# Patient Record
Sex: FEMALE | Race: WHITE | HISPANIC OR LATINO | Employment: FULL TIME | URBAN - METROPOLITAN AREA
[De-identification: names, ages, dates, MRNs, and addresses within clinical notes are randomized per-mention and may not be internally consistent; named-entity substitution may affect disease eponyms.]

---

## 2023-02-12 PROBLEM — F98.8 ADD (ATTENTION DEFICIT DISORDER): Status: ACTIVE | Noted: 2023-02-12

## 2023-02-12 PROBLEM — R10.11 ABDOMINAL PAIN, RUQ: Status: ACTIVE | Noted: 2023-02-12

## 2023-02-12 PROBLEM — G56.20 ULNAR NEUROPATHY: Status: ACTIVE | Noted: 2023-02-12

## 2023-02-12 PROBLEM — R03.0 BORDERLINE SYSTOLIC HYPERTENSION: Status: ACTIVE | Noted: 2023-02-12

## 2023-02-12 PROBLEM — L30.9 DERMATITIS: Status: ACTIVE | Noted: 2023-02-12

## 2023-02-12 PROBLEM — G54.0 THORACIC OUTLET SYNDROME: Status: ACTIVE | Noted: 2023-02-12

## 2023-02-12 PROBLEM — K63.8219 SMALL INTESTINAL BACTERIAL OVERGROWTH: Status: ACTIVE | Noted: 2023-02-12

## 2023-02-12 PROBLEM — R00.2 PALPITATIONS: Status: ACTIVE | Noted: 2023-02-12

## 2023-02-12 PROBLEM — I72.9 PSEUDOANEURYSM (CMS-HCC): Status: ACTIVE | Noted: 2023-02-12

## 2023-02-12 PROBLEM — R21 RASH: Status: ACTIVE | Noted: 2023-02-12

## 2023-02-12 PROBLEM — G95.0 SYRINX OF SPINAL CORD (MULTI): Status: ACTIVE | Noted: 2023-02-12

## 2023-02-12 PROBLEM — I47.10 PAROXYSMAL SVT (SUPRAVENTRICULAR TACHYCARDIA) (CMS-HCC): Status: ACTIVE | Noted: 2023-02-12

## 2023-02-12 PROBLEM — G43.909 MIGRAINE: Status: ACTIVE | Noted: 2023-02-12

## 2023-02-12 PROBLEM — R42 VERTIGO: Status: ACTIVE | Noted: 2023-02-12

## 2023-02-12 PROBLEM — I77.0 AV FISTULA (CMS-HCC): Status: ACTIVE | Noted: 2023-02-12

## 2023-02-12 PROBLEM — G43.019 INTRACTABLE MIGRAINE WITHOUT AURA AND WITHOUT STATUS MIGRAINOSUS: Status: ACTIVE | Noted: 2023-02-12

## 2023-02-12 PROBLEM — E55.9 VITAMIN D DEFICIENCY: Status: ACTIVE | Noted: 2023-02-12

## 2023-02-12 PROBLEM — M25.512 SHOULDER PAIN, LEFT: Status: ACTIVE | Noted: 2023-02-12

## 2023-02-12 PROBLEM — R00.0 TACHYCARDIA: Status: ACTIVE | Noted: 2023-02-12

## 2023-02-12 PROBLEM — M54.2 NECK PAIN: Status: ACTIVE | Noted: 2023-02-12

## 2023-02-12 PROBLEM — K52.9 CHRONIC DIARRHEA OF UNKNOWN ORIGIN: Status: ACTIVE | Noted: 2023-02-12

## 2023-02-12 PROBLEM — F41.9 ANXIETY: Status: ACTIVE | Noted: 2023-02-12

## 2023-02-12 PROBLEM — R20.2 PARESTHESIA: Status: ACTIVE | Noted: 2023-02-12

## 2023-02-12 PROBLEM — R09.81 NASAL CONGESTION: Status: ACTIVE | Noted: 2023-02-12

## 2023-02-12 RX ORDER — MOMETASONE FUROATE 1 MG/G
CREAM TOPICAL
COMMUNITY
Start: 2021-06-14

## 2023-02-12 RX ORDER — AMLODIPINE BESYLATE 2.5 MG/1
TABLET ORAL
COMMUNITY
End: 2023-12-20 | Stop reason: SDUPTHER

## 2023-02-12 RX ORDER — TIZANIDINE 4 MG/1
TABLET ORAL
COMMUNITY
Start: 2022-02-16 | End: 2023-12-20 | Stop reason: WASHOUT

## 2023-02-12 RX ORDER — NORETHINDRONE ACETATE AND ETHINYL ESTRADIOL 1.5-30(21)
KIT ORAL
COMMUNITY
Start: 2018-08-09

## 2023-02-12 RX ORDER — DEXMETHYLPHENIDATE HYDROCHLORIDE 15 MG/1
CAPSULE, EXTENDED RELEASE ORAL
COMMUNITY
Start: 2019-09-30 | End: 2023-03-17 | Stop reason: SDUPTHER

## 2023-02-12 RX ORDER — ACETAMINOPHEN, DEXTROMETHORPHAN HBR, DOXYLAMINE SUCCINATE, PHENYLEPHRINE HCL 650; 20; 12.5; 1 MG/30ML; MG/30ML; MG/30ML; MG/30ML
SOLUTION ORAL
COMMUNITY
Start: 2017-12-18

## 2023-03-17 ENCOUNTER — OFFICE VISIT (OUTPATIENT)
Dept: PRIMARY CARE | Facility: CLINIC | Age: 28
End: 2023-03-17
Payer: COMMERCIAL

## 2023-03-17 VITALS
BODY MASS INDEX: 25.54 KG/M2 | WEIGHT: 148.8 LBS | SYSTOLIC BLOOD PRESSURE: 126 MMHG | TEMPERATURE: 97.8 F | DIASTOLIC BLOOD PRESSURE: 78 MMHG

## 2023-03-17 DIAGNOSIS — F98.8 ATTENTION DEFICIT DISORDER, UNSPECIFIED HYPERACTIVITY PRESENCE: Primary | ICD-10-CM

## 2023-03-17 PROCEDURE — 1036F TOBACCO NON-USER: CPT | Performed by: INTERNAL MEDICINE

## 2023-03-17 PROCEDURE — 80307 DRUG TEST PRSMV CHEM ANLYZR: CPT

## 2023-03-17 PROCEDURE — 99213 OFFICE O/P EST LOW 20 MIN: CPT | Performed by: INTERNAL MEDICINE

## 2023-03-17 PROCEDURE — 80324 DRUG SCREEN AMPHETAMINES 1/2: CPT

## 2023-03-17 RX ORDER — DEXMETHYLPHENIDATE HYDROCHLORIDE 15 MG/1
15 CAPSULE, EXTENDED RELEASE ORAL DAILY
Qty: 30 CAPSULE | Refills: 0 | Status: SHIPPED | OUTPATIENT
Start: 2023-03-17 | End: 2023-05-08 | Stop reason: SDUPTHER

## 2023-03-17 NOTE — PROGRESS NOTES
Subjective   Patient ID: Marina Quiroga is a 27 y.o. female who presents for med follow up.    HPI   Good focus and concentration but no anxiety. In general feels well. Again no issue with anxiety currently. Substantially improved work function with stimulant. No change in appetite. No insomnia. In general feels quite well.     Review of Systems   All other systems reviewed and are negative.    Objective   /78   Temp 36.6 °C (97.8 °F)   Wt 67.5 kg (148 lb 12.8 oz)   BMI 25.54 kg/m²     Physical Exam  Constitutional:       Appearance: Normal appearance.   Neurological:      Mental Status: She is alert.   Psychiatric:         Mood and Affect: Mood normal.         Thought Content: Thought content normal.         Judgment: Judgment normal.     Assessment/Plan   Problem List Items Addressed This Visit          Other    ADD (attention deficit disorder) - Primary    Relevant Medications    dexmethylphenidate XR (Focalin XR) 15 mg 24 hr capsule    Other Relevant Orders    Amphetamine Confirm, Urine    Drug Screen, Urine With Reflex to Confirmation    Drug Screen, Urine With Reflex to Confirmation (Completed)    Amphetamine Confirm, Urine       #1 ADD. con't rx. Spent time reviewing risks of this medicine. Discussed abuse and addiction potential. Discussed potential cardiovascular adverse events. Discussed anxiety. On tract updated. I have personally reviewed the OARRS report for the patient. This report is scanned into the electronic medical record. I have considered the risks of abuse, dependence, addiction and diversion. I believe that it is clinically appropriate for the patient to be prescribed this medication. . Urine next visit. Consider reducing dose next visit.  Per cardiologist, safety continue stimulant  #2 cervical spine lesion- status post neurosurgery evaluation. Follow-up neurology  #2 headaches-much better  #4 CTS- good   #5 SVT/AVNRT- clinically stable. Follow with cardiology/EP  #6 Anxiety-  better/good.   #7 AV fistula- f/u vasc  #8 increased BPs- con't low salt. exercsie. c/s INB ~6 mths (?b-blocker).   #9 vertigo- low-grade. c/s PT. f/u inb 3-4 weeks

## 2023-03-18 LAB
AMPHETAMINE (PRESENCE) IN URINE BY SCREEN METHOD: NORMAL
BARBITURATES PRESENCE IN URINE BY SCREEN METHOD: NORMAL
BENZODIAZEPINE (PRESENCE) IN URINE BY SCREEN METHOD: NORMAL
CANNABINOIDS IN URINE BY SCREEN METHOD: NORMAL
COCAINE (PRESENCE) IN URINE BY SCREEN METHOD: NORMAL
DRUG SCREEN COMMENT URINE: NORMAL
FENTANYL URINE: NORMAL
METHADONE (PRESENCE) IN URINE BY SCREEN METHOD: NORMAL
OPIATES (PRESENCE) IN URINE BY SCREEN METHOD: NORMAL
OXYCODONE (PRESENCE) IN URINE BY SCREEN METHOD: NORMAL
PHENCYCLIDINE (PRESENCE) IN URINE BY SCREEN METHOD: NORMAL

## 2023-03-22 LAB
AMPHETAMINES,URINE: <50 NG/ML
MDA,URINE: <200 NG/ML
MDEA,URINE: <200 NG/ML
MDMA,UR: <200 NG/ML
METHAMPHETAMINE QUANTITATIVE URINE: <200 NG/ML
PHENTERMINE,UR: <200 NG/ML

## 2023-05-05 ENCOUNTER — TELEPHONE (OUTPATIENT)
Dept: PRIMARY CARE | Facility: CLINIC | Age: 28
End: 2023-05-05
Payer: COMMERCIAL

## 2023-05-05 NOTE — TELEPHONE ENCOUNTER
Patient called, left message, stated that [facility] on [address] is out of stock for Dexmethylphenidate and would like you to forward the Rx to [facility] on [address] in [facility].

## 2023-05-08 DIAGNOSIS — F98.8 ATTENTION DEFICIT DISORDER, UNSPECIFIED HYPERACTIVITY PRESENCE: ICD-10-CM

## 2023-05-10 RX ORDER — DEXMETHYLPHENIDATE HYDROCHLORIDE 15 MG/1
15 CAPSULE, EXTENDED RELEASE ORAL DAILY
Qty: 30 CAPSULE | Refills: 0 | Status: SHIPPED | OUTPATIENT
Start: 2023-05-10 | End: 2023-08-28 | Stop reason: SDUPTHER

## 2023-08-28 ENCOUNTER — TELEPHONE (OUTPATIENT)
Dept: PRIMARY CARE | Facility: CLINIC | Age: 28
End: 2023-08-28
Payer: COMMERCIAL

## 2023-08-28 DIAGNOSIS — F98.8 ATTENTION DEFICIT DISORDER, UNSPECIFIED HYPERACTIVITY PRESENCE: ICD-10-CM

## 2023-08-28 RX ORDER — DEXMETHYLPHENIDATE HYDROCHLORIDE 15 MG/1
15 CAPSULE, EXTENDED RELEASE ORAL DAILY
Qty: 30 CAPSULE | Refills: 0 | Status: SHIPPED | OUTPATIENT
Start: 2023-08-28 | End: 2023-10-19 | Stop reason: SDUPTHER

## 2023-08-28 NOTE — TELEPHONE ENCOUNTER
Patient has apt in Sept, but needs a refill on Dexmethylphenidate 15mg now to [facility].  She took her last one today

## 2023-08-28 NOTE — TELEPHONE ENCOUNTER
Pt left a msg asking for a refill of her Focalin XR 15 mg.  Pharm is [facility], [address]  Pt has VV on 9/5.

## 2023-09-05 ENCOUNTER — APPOINTMENT (OUTPATIENT)
Dept: PRIMARY CARE | Facility: CLINIC | Age: 28
End: 2023-09-05
Payer: COMMERCIAL

## 2023-09-15 ENCOUNTER — TELEMEDICINE (OUTPATIENT)
Dept: PRIMARY CARE | Facility: CLINIC | Age: 28
End: 2023-09-15
Payer: COMMERCIAL

## 2023-09-15 DIAGNOSIS — I47.10 PAROXYSMAL SVT (SUPRAVENTRICULAR TACHYCARDIA) (CMS-HCC): ICD-10-CM

## 2023-09-15 DIAGNOSIS — G95.0 SYRINX OF SPINAL CORD (MULTI): ICD-10-CM

## 2023-09-15 DIAGNOSIS — F98.8 ATTENTION DEFICIT DISORDER, UNSPECIFIED HYPERACTIVITY PRESENCE: ICD-10-CM

## 2023-09-15 PROCEDURE — 99213 OFFICE O/P EST LOW 20 MIN: CPT | Performed by: INTERNAL MEDICINE

## 2023-09-15 NOTE — PROGRESS NOTES
Subjective   Patient ID: Marina Quiroga is a 28 y.o. female who presents for No chief complaint on file..    HPI   Good focus and concentration but no anxiety. In general feels well. Again no issue with anxiety currently. Substantially improved work function with stimulant. No change in appetite. No insomnia. In general feels quite well.     Review of Systems   All other systems reviewed and are negative.      Objective   There were no vitals taken for this visit.    Physical Exam  Constitutional:       Appearance: Normal appearance.   Neurological:      Mental Status: She is alert.   Psychiatric:         Mood and Affect: Mood normal.         Thought Content: Thought content normal.         Judgment: Judgment normal.       Assessment/Plan   Problem List Items Addressed This Visit    None    #1 ADD. con't rx. Spent time reviewing risks of this medicine. Discussed abuse and addiction potential. Discussed potential cardiovascular adverse events. Discussed anxiety. On tract updated. I have personally reviewed the OARRS report for the patient. This report is scanned into the electronic medical record. I have considered the risks of abuse, dependence, addiction and diversion. I believe that it is clinically appropriate for the patient to be prescribed this medication. . Urine next visit. Consider reducing dose next visit.  Per cardiologist, safety continue stimulant  #2 cervical spine lesion- status post neurosurgery evaluation. Follow-up neurology  #2 headaches-much better  #4 CTS- good   #5 SVT/AVNRT- clinically stable. Follow with cardiology/EP  #6 Anxiety- better/good.   #7 AV fistula- f/u vasc  #8 increased BPs- con't low salt. exercsie. f/u  cards  #9 vertigo- low-grade. c/s PT. f/u inb 3-4 weeks

## 2023-10-03 ENCOUNTER — PHARMACY VISIT (OUTPATIENT)
Dept: PHARMACY | Facility: CLINIC | Age: 28
End: 2023-10-03
Payer: MEDICARE

## 2023-10-03 ENCOUNTER — SPECIALTY PHARMACY (OUTPATIENT)
Dept: PHARMACY | Facility: CLINIC | Age: 28
End: 2023-10-03

## 2023-10-03 ENCOUNTER — TELEPHONE (OUTPATIENT)
Dept: PRIMARY CARE | Facility: CLINIC | Age: 28
End: 2023-10-03
Payer: COMMERCIAL

## 2023-10-03 PROCEDURE — RXMED WILLOW AMBULATORY MEDICATION CHARGE

## 2023-10-03 NOTE — TELEPHONE ENCOUNTER
Pt left a msg asking for a refill of her Dicyclomine 10 mg.  This is for her irritable bowel and she usually needs to 1 time a yr.  Her pharm is [facility].  You can find this in EMR.

## 2023-10-04 DIAGNOSIS — K58.9 IRRITABLE BOWEL SYNDROME, UNSPECIFIED TYPE: Primary | ICD-10-CM

## 2023-10-04 RX ORDER — DICYCLOMINE HYDROCHLORIDE 10 MG/1
10 CAPSULE ORAL 3 TIMES DAILY PRN
Qty: 30 CAPSULE | Refills: 0 | Status: SHIPPED | OUTPATIENT
Start: 2023-10-04 | End: 2023-12-03

## 2023-10-19 ENCOUNTER — TELEPHONE (OUTPATIENT)
Dept: PRIMARY CARE | Facility: CLINIC | Age: 28
End: 2023-10-19
Payer: COMMERCIAL

## 2023-10-19 DIAGNOSIS — F98.8 ATTENTION DEFICIT DISORDER, UNSPECIFIED HYPERACTIVITY PRESENCE: ICD-10-CM

## 2023-10-19 RX ORDER — DEXMETHYLPHENIDATE HYDROCHLORIDE 15 MG/1
15 CAPSULE, EXTENDED RELEASE ORAL DAILY
Qty: 30 CAPSULE | Refills: 0 | Status: SHIPPED | OUTPATIENT
Start: 2023-10-19 | End: 2023-12-20 | Stop reason: SDUPTHER

## 2023-11-15 ENCOUNTER — APPOINTMENT (OUTPATIENT)
Dept: CARDIOLOGY | Facility: CLINIC | Age: 28
End: 2023-11-15
Payer: COMMERCIAL

## 2023-12-20 ENCOUNTER — TELEPHONE (OUTPATIENT)
Dept: PRIMARY CARE | Facility: CLINIC | Age: 28
End: 2023-12-20

## 2023-12-20 ENCOUNTER — OFFICE VISIT (OUTPATIENT)
Dept: CARDIOLOGY | Facility: CLINIC | Age: 28
End: 2023-12-20
Payer: COMMERCIAL

## 2023-12-20 VITALS
HEIGHT: 65 IN | WEIGHT: 150 LBS | SYSTOLIC BLOOD PRESSURE: 124 MMHG | BODY MASS INDEX: 24.99 KG/M2 | HEART RATE: 81 BPM | DIASTOLIC BLOOD PRESSURE: 84 MMHG | OXYGEN SATURATION: 98 %

## 2023-12-20 DIAGNOSIS — R00.0 TACHYCARDIA: Primary | ICD-10-CM

## 2023-12-20 DIAGNOSIS — R03.0 BORDERLINE SYSTOLIC HYPERTENSION: ICD-10-CM

## 2023-12-20 DIAGNOSIS — F98.8 ATTENTION DEFICIT DISORDER, UNSPECIFIED HYPERACTIVITY PRESENCE: ICD-10-CM

## 2023-12-20 PROCEDURE — 93005 ELECTROCARDIOGRAM TRACING: CPT | Performed by: INTERNAL MEDICINE

## 2023-12-20 PROCEDURE — 99213 OFFICE O/P EST LOW 20 MIN: CPT | Mod: 25 | Performed by: INTERNAL MEDICINE

## 2023-12-20 PROCEDURE — 93010 ELECTROCARDIOGRAM REPORT: CPT | Performed by: INTERNAL MEDICINE

## 2023-12-20 PROCEDURE — 99213 OFFICE O/P EST LOW 20 MIN: CPT | Performed by: INTERNAL MEDICINE

## 2023-12-20 PROCEDURE — 1036F TOBACCO NON-USER: CPT | Performed by: INTERNAL MEDICINE

## 2023-12-20 RX ORDER — AMLODIPINE BESYLATE 2.5 MG/1
2.5 TABLET ORAL DAILY
Qty: 90 TABLET | Refills: 3 | Status: SHIPPED | OUTPATIENT
Start: 2023-12-20

## 2023-12-20 ASSESSMENT — COLUMBIA-SUICIDE SEVERITY RATING SCALE - C-SSRS
6. HAVE YOU EVER DONE ANYTHING, STARTED TO DO ANYTHING, OR PREPARED TO DO ANYTHING TO END YOUR LIFE?: NO
1. IN THE PAST MONTH, HAVE YOU WISHED YOU WERE DEAD OR WISHED YOU COULD GO TO SLEEP AND NOT WAKE UP?: NO
2. HAVE YOU ACTUALLY HAD ANY THOUGHTS OF KILLING YOURSELF?: NO

## 2023-12-20 ASSESSMENT — PATIENT HEALTH QUESTIONNAIRE - PHQ9
1. LITTLE INTEREST OR PLEASURE IN DOING THINGS: NOT AT ALL
SUM OF ALL RESPONSES TO PHQ9 QUESTIONS 1 AND 2: 0
2. FEELING DOWN, DEPRESSED OR HOPELESS: NOT AT ALL

## 2023-12-20 NOTE — TELEPHONE ENCOUNTER
Patient called 12/19/23, left ms, requesting refill of generic of Focalin XR be sent to [facility] in [place].    Last seen 9/15/23.

## 2023-12-20 NOTE — PATIENT INSTRUCTIONS
You were seen in the [facility] for your palpitations.     Your blood pressure has been borderline high in the 130s-140s mm Hg range. Blood pressure may be affecting your palpitations and likely increases with exercise. I recommend we start amlodipine 2.5 mg a day to help relax the muscles in the walls of your arteries to lower blood pressure and make it easier for the body to deliver blood flow to your muscles during exercise.     WIth your history of SVT including July 2021 ablation for AV abel re-entry tachycardia (AVNRT), I had you get a 2 week heart rate monitor in 5/26 - 6/9/2022 showed normal heart rhythms without sustained SVT and only rare PACs/PVCs.      Extra heart beats called premature atrial or premature ventricular contractions coming from the top or bottom of the heart. These extra beats cause the heart to pause for a slightly longer time than a normal heart beat and the next beat of the heart is stronger than normal (causing the feeling of a skip and then a thump). This is normal for how the heart works and these extra beats are not dangerous.     I recommend that you try to minimize emotional stress, get enough sleep at night, and minimize alcohol and caffeine intake to help prevent you from sensing these normal extra heart beats.      Your heart exam today is normal. Your ECG today is normal variant and the same as your last ECG in September 2021 with heart rate 77 beats per minute and incomplete right bundle branch block.

## 2023-12-20 NOTE — PROGRESS NOTES
Subjective   Marina Quiroga is a 28 y.o. female who presents to the [facility] ffor follow up of palpitations. Last seen in November 2022.     She has a history of SVT diagnosed at age 25 yo. 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 Gomez. This ablation was complicated by post-procedural R femoral AV fistula formation that was treated conservatively. We did 2 week HR monitor 5/26 - 6/9/2022 for increase in palpitation symptomatology to see if there was SVT recurrence but only rare PAC/PVCs detected.     Notes only post-exercise brief (seconds) palpitation feeling 4-5x/week.     She has lower resting stress levels and this has correlated with reduction in palpitation symptoms. Blood pressure readings at home have been well controlled 120-130 mm Hg taking amlodipine down from 2022 baseline of borderline increased BP with 130s-140s mm Hg despite eating low salt diet and maintaining vigorous exercise schedule. No OTC supplement use. Has been on stable dose of dexmethylphenidate since 2021 and was previously on higher dose.     No active cardiac symptoms of chest pain, dyspnea on exertion, PND, orthopnea, TULIO, syncope, or claudication. She is an  and has good exercise capacity.     Past Medical History:  1 SVT: s/p 7/2021 AVNRT RFA  2. ADHD  3. Migraine headaches  4. Anxiety      Social History:  Nonsmoker     Family History:  Father has CAD and has had MI x2     Review of Systems    A 14 point review of systems was asked. All questions were negative except for pertinent positives listed in the HPI.      Objective   Physical Exam  BP Readings from Last 3 Encounters:   12/20/23 124/84   03/17/23 126/78   02/17/23 139/81      Wt Readings from Last 3 Encounters:   12/20/23 68 kg (150 lb)   03/17/23 67.5 kg (148 lb 12.8 oz)   02/17/23 66.2 kg (146 lb)      BMI: Estimated body mass index is 24.96 kg/m² as calculated from the following:    Height as of this encounter: 1.651 m (5'  "5\").    Weight as of this encounter: 68 kg (150 lb).  BSA: Estimated body surface area is 1.77 meters squared as calculated from the following:    Height as of this encounter: 1.651 m (5' 5\").    Weight as of this encounter: 68 kg (150 lb).    General: no acute distress  HEENT: EOMI, no scleral icterus.  Lungs: Clear to auscultation bilaterally without wheezing, rales, or rhonchi.  Cardiovascular: Regular rhythm and rate. Normal S1 and S2. No murmurs, rubs, or gallops are appreciated. JVP normal.  Abdomen: Soft, nontender, nondistended. Bowel sounds present.  Extremities: Warm and well perfused with equal 2+ pulses bilaterally.  No edema present.  Neurologic: Alert and oriented x3.    I have personally reviewed the following images and laboratory findings:  Last echocardiogram: 10/25/2019 ([facility]) echo: LV EF 60-65%, no LVH (LVMI 73 gm/m2), normal diastology (E/e' 8.6), normal LA size (ANA 27 ml/m2), normal RV/RA, no AI, trace MR, trace TR, RVSP 23 mm Hg (RAP 3 mm Hg)    Last cath / stress test: 10/25/2019 ([facility]) ETT treadmill: 17.1 METs, no ischemia, peak  bpm (100% APMHR), /76 -> 164/80 mmHg    Most recent EC2023 ECG: Sinus rhythm, 77 bpm, incomplete RBBB. Unchanged from  ECG.    2022 ECG: Sinus rhythm, 77 bpm, marked sinus arrhythmia, incomplete RBBB. ECG unchanged from 2021.      Assessment/Plan   1. Palpitations / h/o SVT:  Had AVNRT RFA in 2021 by Dr. Gomez. Has recurrence of some palpitations training for 1/2 marathon this .    Had echocardiogram done in [place] [place] 4 years ago with finding that her heart is structurally normal. We do not need to repeat this test now.     May 2022 2 week HR monitor showed no recurring SVT and rare PACs/PVCs. Trigger avoidance recommendations given to patient. Now with rare ectopic beats 5x/week that are self limited.     2. Borderline hypertension:  Blood pressure well controlled with amlodipine 2.5 mg a day. At goal  SBP range " 120-130 mm Hg. Increase in BP does not correlate with dose adjustments of stimulant medication for ADHD. Keep BP log book.      Follow up with Dr. Lizama in 12 months.           SIGNATURE: Alex Lizama MD PATIENT NAME: Marina Quiroga   DATE/TIME: December 20, 2023 3:24 PM MRN: [id number]

## 2023-12-21 LAB
ATRIAL RATE: 77 BPM
P AXIS: 32 DEGREES
P OFFSET: 207 MS
P ONSET: 161 MS
PR INTERVAL: 122 MS
Q ONSET: 222 MS
QRS COUNT: 13 BEATS
QRS DURATION: 94 MS
QT INTERVAL: 390 MS
QTC CALCULATION(BAZETT): 441 MS
QTC FREDERICIA: 423 MS
R AXIS: 35 DEGREES
T AXIS: 29 DEGREES
T OFFSET: 417 MS
VENTRICULAR RATE: 77 BPM

## 2023-12-21 RX ORDER — DEXMETHYLPHENIDATE HYDROCHLORIDE 15 MG/1
15 CAPSULE, EXTENDED RELEASE ORAL DAILY
Qty: 30 CAPSULE | Refills: 0 | Status: SHIPPED | OUTPATIENT
Start: 2023-12-21 | End: 2024-02-13 | Stop reason: SDUPTHER

## 2024-01-22 ENCOUNTER — OFFICE VISIT (OUTPATIENT)
Dept: NEUROLOGY | Facility: CLINIC | Age: 29
End: 2024-01-22
Payer: COMMERCIAL

## 2024-01-22 VITALS
WEIGHT: 150 LBS | BODY MASS INDEX: 25.61 KG/M2 | SYSTOLIC BLOOD PRESSURE: 136 MMHG | HEART RATE: 56 BPM | RESPIRATION RATE: 16 BRPM | HEIGHT: 64 IN | DIASTOLIC BLOOD PRESSURE: 81 MMHG

## 2024-01-22 DIAGNOSIS — G43.009 MIGRAINE WITHOUT AURA, NOT INTRACTABLE, WITHOUT STATUS MIGRAINOSUS: ICD-10-CM

## 2024-01-22 DIAGNOSIS — G43.109 MIGRAINE WITH AURA, NOT INTRACTABLE, WITHOUT STATUS MIGRAINOSUS: Primary | ICD-10-CM

## 2024-01-22 PROCEDURE — 99214 OFFICE O/P EST MOD 30 MIN: CPT | Performed by: STUDENT IN AN ORGANIZED HEALTH CARE EDUCATION/TRAINING PROGRAM

## 2024-01-22 PROCEDURE — 1036F TOBACCO NON-USER: CPT | Performed by: STUDENT IN AN ORGANIZED HEALTH CARE EDUCATION/TRAINING PROGRAM

## 2024-01-22 ASSESSMENT — ENCOUNTER SYMPTOMS
DEPRESSION: 0
LOSS OF SENSATION IN FEET: 0
OCCASIONAL FEELINGS OF UNSTEADINESS: 0

## 2024-01-22 ASSESSMENT — PATIENT HEALTH QUESTIONNAIRE - PHQ9
2. FEELING DOWN, DEPRESSED OR HOPELESS: NOT AT ALL
SUM OF ALL RESPONSES TO PHQ9 QUESTIONS 1 AND 2: 0
1. LITTLE INTEREST OR PLEASURE IN DOING THINGS: NOT AT ALL

## 2024-01-22 ASSESSMENT — PAIN SCALES - GENERAL: PAINLEVEL: 0-NO PAIN

## 2024-01-22 NOTE — PROGRESS NOTES
Subjective   Marina Quiroga is a 28 y.o.   female who is being followed for episodic migraine wwo aura.     Since last seen, patient states that she is having 4/30 HA days per month, which is an increase from prior visit. Mostly triggered during training for half marathon, mainly with running outside. Nurtec is very effective within 20 minutes without side effect.     Current Outpatient Medications   Medication Instructions    amLODIPine (NORVASC) 2.5 mg, oral, Daily, Take 1 tablet daily.    cyanocobalamin, vitamin B-12, (Vitamin B-12) 1,000 mcg tablet extended release Take 1 tablet daily    dexmethylphenidate XR (FOCALIN XR) 15 mg, oral, Daily, Do not crush, chew, or split.    mometasone (Elocon) 0.1 % cream APPLY TO THE AFFECTED AREA ON FACE AS DIRECTED TWICE DAILY    NON FORMULARY Vitamin D 50 MCG (2000UNIT) Oral Capsule; Take 1 capsule daily    norethindrone-e.estradioL-iron (Microgestin FE 1.5/30) 1.5 mg-30 mcg (21)/75 mg (7) tablet TAKE 1 TABLET DAILY.    rimegepant (NURTEC) 75 mg tablet,disintegrating DISSOLVE ONE (1) ORAL DISINTEGRATING TABLET IN MOUTH AT THE FIRST ONSET OF MIGRAINE. DO NOT EXCEED MORE THAN ONE TABLET IN 24 HOUR PERIOD.       Assessment/Plan   Patient with episodic migraine wwo aura. Having 4/30 HA days per month. Nurtec effective for breakthrough headaches. Discussed potential of starting amitriptyline, but patient does not want to start at this time.     - continue Nurtec 75mg PRN  - follow up PRN

## 2024-01-24 ENCOUNTER — SPECIALTY PHARMACY (OUTPATIENT)
Dept: PHARMACY | Facility: CLINIC | Age: 29
End: 2024-01-24

## 2024-01-24 PROCEDURE — RXMED WILLOW AMBULATORY MEDICATION CHARGE

## 2024-01-25 ENCOUNTER — PHARMACY VISIT (OUTPATIENT)
Dept: PHARMACY | Facility: CLINIC | Age: 29
End: 2024-01-25
Payer: MEDICARE

## 2024-02-12 ENCOUNTER — TELEPHONE (OUTPATIENT)
Dept: PRIMARY CARE | Facility: CLINIC | Age: 29
End: 2024-02-12
Payer: COMMERCIAL

## 2024-02-12 NOTE — TELEPHONE ENCOUNTER
Pt left a msg asking for a refill of her Focalin.  [facility] is [facility].  She has an appt scheduled.

## 2024-02-13 DIAGNOSIS — F98.8 ATTENTION DEFICIT DISORDER, UNSPECIFIED HYPERACTIVITY PRESENCE: ICD-10-CM

## 2024-02-13 RX ORDER — DEXMETHYLPHENIDATE HYDROCHLORIDE 15 MG/1
15 CAPSULE, EXTENDED RELEASE ORAL DAILY
Qty: 30 CAPSULE | Refills: 0 | Status: SHIPPED | OUTPATIENT
Start: 2024-02-13 | End: 2024-04-23 | Stop reason: SDUPTHER

## 2024-02-20 ENCOUNTER — APPOINTMENT (OUTPATIENT)
Dept: PRIMARY CARE | Facility: CLINIC | Age: 29
End: 2024-02-20
Payer: COMMERCIAL

## 2024-02-22 ENCOUNTER — APPOINTMENT (OUTPATIENT)
Dept: NEUROLOGY | Facility: CLINIC | Age: 29
End: 2024-02-22
Payer: COMMERCIAL

## 2024-02-23 ENCOUNTER — SPECIALTY PHARMACY (OUTPATIENT)
Dept: PHARMACY | Facility: CLINIC | Age: 29
End: 2024-02-23

## 2024-03-04 ENCOUNTER — SPECIALTY PHARMACY (OUTPATIENT)
Dept: PHARMACY | Facility: CLINIC | Age: 29
End: 2024-03-04

## 2024-03-04 PROCEDURE — RXMED WILLOW AMBULATORY MEDICATION CHARGE

## 2024-03-07 ENCOUNTER — OFFICE VISIT (OUTPATIENT)
Dept: PRIMARY CARE | Facility: CLINIC | Age: 29
End: 2024-03-07
Payer: COMMERCIAL

## 2024-03-07 ENCOUNTER — SPECIALTY PHARMACY (OUTPATIENT)
Dept: PHARMACY | Facility: CLINIC | Age: 29
End: 2024-03-07

## 2024-03-07 ENCOUNTER — PHARMACY VISIT (OUTPATIENT)
Dept: PHARMACY | Facility: CLINIC | Age: 29
End: 2024-03-07
Payer: MEDICARE

## 2024-03-07 VITALS — WEIGHT: 148 LBS | SYSTOLIC BLOOD PRESSURE: 120 MMHG | BODY MASS INDEX: 25.4 KG/M2 | DIASTOLIC BLOOD PRESSURE: 80 MMHG

## 2024-03-07 DIAGNOSIS — F98.8 ATTENTION DEFICIT DISORDER, UNSPECIFIED HYPERACTIVITY PRESENCE: ICD-10-CM

## 2024-03-07 DIAGNOSIS — G95.0 SYRINX OF SPINAL CORD (MULTI): ICD-10-CM

## 2024-03-07 DIAGNOSIS — I72.9 PSEUDOANEURYSM (CMS-HCC): Primary | ICD-10-CM

## 2024-03-07 DIAGNOSIS — I77.0 AV FISTULA (CMS-HCC): ICD-10-CM

## 2024-03-07 PROCEDURE — 80346 BENZODIAZEPINES1-12: CPT

## 2024-03-07 PROCEDURE — 1036F TOBACCO NON-USER: CPT | Performed by: INTERNAL MEDICINE

## 2024-03-07 PROCEDURE — 80365 DRUG SCREENING OXYCODONE: CPT

## 2024-03-07 PROCEDURE — 99213 OFFICE O/P EST LOW 20 MIN: CPT | Performed by: INTERNAL MEDICINE

## 2024-03-07 PROCEDURE — 80368 SEDATIVE HYPNOTICS: CPT

## 2024-03-07 PROCEDURE — 82570 ASSAY OF URINE CREATININE: CPT

## 2024-03-07 PROCEDURE — 80373 DRUG SCREENING TRAMADOL: CPT

## 2024-03-07 PROCEDURE — 80354 DRUG SCREENING FENTANYL: CPT

## 2024-03-07 PROCEDURE — 80358 DRUG SCREENING METHADONE: CPT

## 2024-03-07 PROCEDURE — 80307 DRUG TEST PRSMV CHEM ANLYZR: CPT

## 2024-03-07 PROCEDURE — 80361 OPIATES 1 OR MORE: CPT

## 2024-03-07 ASSESSMENT — PATIENT HEALTH QUESTIONNAIRE - PHQ9
SUM OF ALL RESPONSES TO PHQ9 QUESTIONS 1 AND 2: 0
2. FEELING DOWN, DEPRESSED OR HOPELESS: NOT AT ALL
1. LITTLE INTEREST OR PLEASURE IN DOING THINGS: NOT AT ALL

## 2024-03-07 NOTE — PROGRESS NOTES
Subjective   Patient ID: Marina Quiroga is a 28 y.o. female who presents for add med followup.    HPI   Good focus and concentration but no anxiety. In general feels well. Again no issue with anxiety currently. Substantially improved work function with stimulant. No change in appetite. No insomnia. In general feels quite well.   Is following w/ cards and neuro--> all stable    Review of Systems   All other systems reviewed and are negative.      Objective   /80   Wt 67.1 kg (148 lb)   BMI 25.40 kg/m²     Physical Exam  Constitutional:       Appearance: Normal appearance.   Neurological:      Mental Status: She is alert.   Psychiatric:         Mood and Affect: Mood normal.         Thought Content: Thought content normal.         Judgment: Judgment normal.       Assessment/Plan   Problem List Items Addressed This Visit       ADD (attention deficit disorder)    Relevant Orders    Opiate/Opioid/Benzo Prescription Compliance    OOB Internal Tracking       #1 ADD. con't rx. Spent time reviewing risks of this medicine. Discussed abuse and addiction potential. Discussed potential cardiovascular adverse events. Discussed anxiety. I have personally reviewed the OARRS report for the patient.  I have considered the risks of abuse, dependence, addiction and diversion. I believe that it is clinically appropriate for the patient to be prescribed this medication. . Urine next visit. Consider reducing dose next visit.  Per cardiologist, safety continue stimulant  #2 cervical spine lesion- status post neurosurgery evaluation. Follow-up neurology  #2 headaches-much better  #4 CTS- good   #5 SVT/AVNRT- clinically stable. Follow with cardiology/EP  #6 Anxiety- better/good.   #7 AV fistula- f/u vasc  #8 increased BPs- con't low salt. exercsie. f/u  cards  #9 vertigo- low-grade. c/s PT. f/u inb 3-4 weeks

## 2024-03-08 LAB
AMPHETAMINES UR QL SCN: NORMAL
BARBITURATES UR QL SCN: NORMAL
BZE UR QL SCN: NORMAL
CANNABINOIDS UR QL SCN: NORMAL
CREAT UR-MCNC: 170.9 MG/DL (ref 20–320)
PCP UR QL SCN: NORMAL

## 2024-04-11 ENCOUNTER — SPECIALTY PHARMACY (OUTPATIENT)
Dept: PHARMACY | Facility: CLINIC | Age: 29
End: 2024-04-11

## 2024-04-23 ENCOUNTER — TELEPHONE (OUTPATIENT)
Dept: PRIMARY CARE | Facility: CLINIC | Age: 29
End: 2024-04-23

## 2024-04-23 DIAGNOSIS — F98.8 ATTENTION DEFICIT DISORDER, UNSPECIFIED HYPERACTIVITY PRESENCE: ICD-10-CM

## 2024-04-23 RX ORDER — DEXMETHYLPHENIDATE HYDROCHLORIDE 15 MG/1
15 CAPSULE, EXTENDED RELEASE ORAL DAILY
Qty: 30 CAPSULE | Refills: 0 | Status: SHIPPED | OUTPATIENT
Start: 2024-04-23 | End: 2024-04-29 | Stop reason: SDUPTHER

## 2024-04-26 DIAGNOSIS — F98.8 ATTENTION DEFICIT DISORDER, UNSPECIFIED HYPERACTIVITY PRESENCE: ICD-10-CM

## 2024-04-26 NOTE — TELEPHONE ENCOUNTER
Pt left a msg stating that her local pharm doesn't have any of her Focalin.  She is requesting that it be sent to the [facility] on [id number] [address]  Info is in the chart.

## 2024-04-30 RX ORDER — DEXMETHYLPHENIDATE HYDROCHLORIDE 15 MG/1
15 CAPSULE, EXTENDED RELEASE ORAL DAILY
Qty: 30 CAPSULE | Refills: 0 | Status: SHIPPED | OUTPATIENT
Start: 2024-04-30 | End: 2024-05-30

## 2024-05-01 ENCOUNTER — PHARMACY VISIT (OUTPATIENT)
Dept: PHARMACY | Facility: CLINIC | Age: 29
End: 2024-05-01
Payer: MEDICARE

## 2024-05-01 ENCOUNTER — SPECIALTY PHARMACY (OUTPATIENT)
Dept: PHARMACY | Facility: CLINIC | Age: 29
End: 2024-05-01

## 2024-05-01 PROCEDURE — RXMED WILLOW AMBULATORY MEDICATION CHARGE

## 2024-05-23 ENCOUNTER — SPECIALTY PHARMACY (OUTPATIENT)
Dept: PHARMACY | Facility: CLINIC | Age: 29
End: 2024-05-23

## 2024-05-27 PROCEDURE — RXMED WILLOW AMBULATORY MEDICATION CHARGE

## 2024-05-29 ENCOUNTER — SPECIALTY PHARMACY (OUTPATIENT)
Dept: PHARMACY | Facility: CLINIC | Age: 29
End: 2024-05-29

## 2024-05-30 ENCOUNTER — PHARMACY VISIT (OUTPATIENT)
Dept: PHARMACY | Facility: CLINIC | Age: 29
End: 2024-05-30
Payer: MEDICARE

## 2024-06-18 DIAGNOSIS — F98.8 ATTENTION DEFICIT DISORDER, UNSPECIFIED HYPERACTIVITY PRESENCE: ICD-10-CM

## 2024-06-18 NOTE — TELEPHONE ENCOUNTER
Patient requesting a refill on dexmethylphenidate xr 15mg to [facility]. LOV 3/7/24  patient said please fill 6/20 or after her new insurance will start on the 20th.

## 2024-06-21 ENCOUNTER — SPECIALTY PHARMACY (OUTPATIENT)
Dept: PHARMACY | Facility: CLINIC | Age: 29
End: 2024-06-21

## 2024-06-21 PROCEDURE — RXMED WILLOW AMBULATORY MEDICATION CHARGE

## 2024-06-21 RX ORDER — DEXMETHYLPHENIDATE HYDROCHLORIDE 15 MG/1
15 CAPSULE, EXTENDED RELEASE ORAL DAILY
Qty: 30 CAPSULE | Refills: 0 | Status: SHIPPED | OUTPATIENT
Start: 2024-06-21 | End: 2024-07-21

## 2024-06-28 ENCOUNTER — PHARMACY VISIT (OUTPATIENT)
Dept: PHARMACY | Facility: CLINIC | Age: 29
End: 2024-06-28
Payer: MEDICARE

## 2024-07-22 ENCOUNTER — SPECIALTY PHARMACY (OUTPATIENT)
Dept: PHARMACY | Facility: CLINIC | Age: 29
End: 2024-07-22

## 2024-07-22 PROCEDURE — RXMED WILLOW AMBULATORY MEDICATION CHARGE

## 2024-07-25 ENCOUNTER — PHARMACY VISIT (OUTPATIENT)
Dept: PHARMACY | Facility: CLINIC | Age: 29
End: 2024-07-25
Payer: MEDICARE

## 2024-08-01 DIAGNOSIS — F98.8 ATTENTION DEFICIT DISORDER, UNSPECIFIED HYPERACTIVITY PRESENCE: ICD-10-CM

## 2024-08-01 NOTE — TELEPHONE ENCOUNTER
Patient called, CAPO, requesting refill of Dexmethylphenidate XR be sent to [facility] in [place].     LOV - 3/7/24

## 2024-08-02 RX ORDER — DEXMETHYLPHENIDATE HYDROCHLORIDE 15 MG/1
15 CAPSULE, EXTENDED RELEASE ORAL DAILY
Qty: 30 CAPSULE | Refills: 0 | Status: SHIPPED | OUTPATIENT
Start: 2024-08-02 | End: 2024-09-01

## 2024-08-12 ENCOUNTER — APPOINTMENT (OUTPATIENT)
Dept: DERMATOLOGY | Facility: CLINIC | Age: 29
End: 2024-08-12
Payer: COMMERCIAL

## 2024-08-21 ENCOUNTER — SPECIALTY PHARMACY (OUTPATIENT)
Dept: PHARMACY | Facility: CLINIC | Age: 29
End: 2024-08-21

## 2024-08-29 ENCOUNTER — APPOINTMENT (OUTPATIENT)
Dept: DERMATOLOGY | Facility: CLINIC | Age: 29
End: 2024-08-29
Payer: COMMERCIAL

## 2024-08-29 DIAGNOSIS — L20.89 OTHER ATOPIC DERMATITIS: ICD-10-CM

## 2024-08-29 DIAGNOSIS — L70.0 ACNE VULGARIS: Primary | ICD-10-CM

## 2024-08-29 PROCEDURE — 99203 OFFICE O/P NEW LOW 30 MIN: CPT | Performed by: DERMATOLOGY

## 2024-08-29 RX ORDER — CLINDAMYCIN PHOSPHATE 10 UG/ML
LOTION TOPICAL 2 TIMES DAILY
Qty: 60 ML | Refills: 3 | Status: SHIPPED | OUTPATIENT
Start: 2024-08-29 | End: 2025-08-29

## 2024-08-29 RX ORDER — BENZOYL PEROXIDE 100 MG/ML
LIQUID TOPICAL 2 TIMES DAILY
Qty: 30 G | Refills: 4 | Status: SHIPPED | OUTPATIENT
Start: 2024-08-29 | End: 2025-08-29

## 2024-08-29 RX ORDER — TACROLIMUS 1 MG/G
OINTMENT TOPICAL 2 TIMES DAILY
Qty: 30 G | Refills: 1 | Status: SHIPPED | OUTPATIENT
Start: 2024-08-29 | End: 2025-08-29

## 2024-08-29 RX ORDER — TRETINOIN 0.25 MG/G
GEL TOPICAL NIGHTLY
Qty: 30 G | Refills: 2 | Status: SHIPPED | OUTPATIENT
Start: 2024-08-29 | End: 2025-08-29

## 2024-08-29 ASSESSMENT — DERMATOLOGY PATIENT ASSESSMENT
ARE YOU ON BIRTH CONTROL: YES
DO YOU USE A TANNING BED: NO
HAVE YOU HAD OR DO YOU HAVE VASCULAR DISEASE: YES
HAVE YOU HAD OR DO YOU HAVE A STAPH INFECTION: NO
WHAT TYPE OF BIRTH CONTROL: PILL
DO YOU USE SUNSCREEN: DAILY
DO YOU HAVE IRREGULAR MENSTRUAL CYCLES: NO
DO YOU HAVE ANY NEW OR CHANGING LESIONS: NO
ARE YOU AN ORGAN TRANSPLANT RECIPIENT: NO
ARE YOU TRYING TO GET PREGNANT: NO

## 2024-08-29 ASSESSMENT — DERMATOLOGY QUALITY OF LIFE (QOL) ASSESSMENT
RATE HOW BOTHERED YOU ARE BY EFFECTS OF YOUR SKIN PROBLEMS ON YOUR ACTIVITIES (EG, GOING OUT, ACCOMPLISHING WHAT YOU WANT, WORK ACTIVITIES OR YOUR RELATIONSHIPS WITH OTHERS): 0 - NEVER BOTHERED
ARE THERE EXCLUSIONS OR EXCEPTIONS FOR THE QUALITY OF LIFE ASSESSMENT: NO
RATE HOW EMOTIONALLY BOTHERED YOU ARE BY YOUR SKIN PROBLEM (FOR EXAMPLE, WORRY, EMBARRASSMENT, FRUSTRATION): 0 - NEVER BOTHERED
DATE THE QUALITY-OF-LIFE ASSESSMENT WAS COMPLETED: 67081
RATE HOW BOTHERED YOU ARE BY SYMPTOMS OF YOUR SKIN PROBLEM (EG, ITCHING, STINGING BURNING, HURTING OR SKIN IRRITATION): 3

## 2024-08-29 ASSESSMENT — ITCH NUMERIC RATING SCALE: HOW SEVERE IS YOUR ITCHING?: 0

## 2024-08-29 ASSESSMENT — PATIENT GLOBAL ASSESSMENT (PGA): PATIENT GLOBAL ASSESSMENT: PATIENT GLOBAL ASSESSMENT:  1 - CLEAR

## 2024-08-29 NOTE — PROGRESS NOTES
Subjective     Marina Quiroga is a 29 y.o. female who presents for the following: Rash (Under eyes). History of steroid shots and topical steroid. Mainly on the right and top of eyelids. Gets pimple like bumps on face.  Using face wash and a balm by andrés posay.  Nail polish last month. Not using neosporin.  Last used mometasone (it makes it go away then it comes back).     Review of Systems:  No other skin or systemic complaints other than what is documented elsewhere in the note.    The following portions of the chart were reviewed this encounter and updated as appropriate:          Skin Cancer History  No skin cancer on file.      Specialty Problems          Dermatology Problems    Dermatitis    Rash        Objective   Well appearing patient in no apparent distress; mood and affect are within normal limits.    A focused skin examination was performed. All findings within normal limits unless otherwise noted below.    Assessment/Plan

## 2024-09-04 PROCEDURE — RXMED WILLOW AMBULATORY MEDICATION CHARGE

## 2024-09-13 ENCOUNTER — APPOINTMENT (OUTPATIENT)
Dept: PRIMARY CARE | Facility: CLINIC | Age: 29
End: 2024-09-13
Payer: COMMERCIAL

## 2024-09-13 VITALS
DIASTOLIC BLOOD PRESSURE: 80 MMHG | BODY MASS INDEX: 26.88 KG/M2 | SYSTOLIC BLOOD PRESSURE: 128 MMHG | WEIGHT: 156.6 LBS | TEMPERATURE: 97.5 F

## 2024-09-13 DIAGNOSIS — I47.10 PAROXYSMAL SVT (SUPRAVENTRICULAR TACHYCARDIA) (CMS-HCC): ICD-10-CM

## 2024-09-13 DIAGNOSIS — F98.8 ATTENTION DEFICIT DISORDER, UNSPECIFIED HYPERACTIVITY PRESENCE: Primary | ICD-10-CM

## 2024-09-13 DIAGNOSIS — Z23 IMMUNIZATION DUE: ICD-10-CM

## 2024-09-13 PROCEDURE — 1036F TOBACCO NON-USER: CPT | Performed by: INTERNAL MEDICINE

## 2024-09-13 PROCEDURE — 90471 IMMUNIZATION ADMIN: CPT | Performed by: INTERNAL MEDICINE

## 2024-09-13 PROCEDURE — 99213 OFFICE O/P EST LOW 20 MIN: CPT | Performed by: INTERNAL MEDICINE

## 2024-09-13 PROCEDURE — 90656 IIV3 VACC NO PRSV 0.5 ML IM: CPT | Performed by: INTERNAL MEDICINE

## 2024-09-13 RX ORDER — DEXMETHYLPHENIDATE HYDROCHLORIDE 15 MG/1
15 CAPSULE, EXTENDED RELEASE ORAL DAILY
Qty: 30 CAPSULE | Refills: 0 | Status: SHIPPED | OUTPATIENT
Start: 2024-09-13 | End: 2024-10-13

## 2024-09-13 ASSESSMENT — PATIENT HEALTH QUESTIONNAIRE - PHQ9
2. FEELING DOWN, DEPRESSED OR HOPELESS: NOT AT ALL
1. LITTLE INTEREST OR PLEASURE IN DOING THINGS: NOT AT ALL
SUM OF ALL RESPONSES TO PHQ9 QUESTIONS 1 AND 2: 0

## 2024-09-13 NOTE — PROGRESS NOTES
Subjective   Patient ID: Marina Quiroga is a 29 y.o. female who presents for Follow-up (Med check) and Flu Vaccine.    HPI   Good focus and concentration but no anxiety. In general feels well. Again no issue with anxiety currently. Substantially improved work function with stimulant. No change in appetite. No insomnia. In general feels quite well.   Is following w/ cards and neuro--> all stable    Review of Systems   All other systems reviewed and are negative.      Objective   /80   Temp 36.4 °C (97.5 °F)   Wt 71 kg (156 lb 9.6 oz)   BMI 26.88 kg/m²     Physical Exam  Constitutional:       Appearance: Normal appearance.   Neurological:      Mental Status: She is alert.   Psychiatric:         Mood and Affect: Mood normal.         Thought Content: Thought content normal.         Judgment: Judgment normal.     Assessment/Plan   Problem List Items Addressed This Visit       ADD (attention deficit disorder)    Paroxysmal SVT (supraventricular tachycardia) (CMS-HCC)     Other Visit Diagnoses       Immunization due                #1 ADD. con't rx. Spent time reviewing risks of this medicine. Discussed abuse and addiction potential. Discussed potential cardiovascular adverse events. Discussed anxiety. I have personally reviewed the OARRS report for the patient.  I have considered the risks of abuse, dependence, addiction and diversion. I believe that it is clinically appropriate for the patient to be prescribed this medication. . Urine next visit. Consider reducing dose next visit.  Per cardiologist, safety continue stimulant  #2 migrains- f/u  neuro PRN.  Con't nurtec PRN   #2 headaches-much better  #4 CTS- good   #5 SVT/AVNRT- clinically stable. Follow with cardiology/EP  #6 Anxiety- better/good.   #7 AV fistula- f/u vasc  #8 increased BPs- good. con't low salt. exercsie. f/u  cards  #9 vertigo- resolved.

## 2024-09-18 ENCOUNTER — PHARMACY VISIT (OUTPATIENT)
Dept: PHARMACY | Facility: CLINIC | Age: 29
End: 2024-09-18
Payer: MEDICARE

## 2024-09-19 ENCOUNTER — OFFICE VISIT (OUTPATIENT)
Dept: DERMATOLOGY | Facility: CLINIC | Age: 29
End: 2024-09-19
Payer: COMMERCIAL

## 2024-09-19 DIAGNOSIS — R21 RASH AND OTHER NONSPECIFIC SKIN ERUPTION: Primary | ICD-10-CM

## 2024-09-19 PROCEDURE — 99213 OFFICE O/P EST LOW 20 MIN: CPT | Performed by: DERMATOLOGY

## 2024-09-19 NOTE — PROGRESS NOTES
Subjective     Marina Quiroga is a 29 y.o. female who presents for the following: Skin Check (Pt stated reaction after using tx BPO clindamycin and tretinoin hive like rash on face stopped on [place] 9/15 but still has burning, redness and itching. ).  Used protopic and the rash went away after 7-8 days after I saw her.  Develop irritation after using benzoyl peroxide, tretinoin, and clindamycin. She did not use protopic on this outbreak.    Review of Systems:  No other skin or systemic complaints other than what is documented elsewhere in the note.    The following portions of the chart were reviewed this encounter and updated as appropriate:          Skin Cancer History  No skin cancer on file.      Specialty Problems          Dermatology Problems    Dermatitis    Rash        Objective   Well appearing patient in no apparent distress; mood and affect are within normal limits.    A focused skin examination was performed. All findings within normal limits unless otherwise noted below.    Assessment/Plan

## 2024-10-10 ENCOUNTER — SPECIALTY PHARMACY (OUTPATIENT)
Dept: PHARMACY | Facility: CLINIC | Age: 29
End: 2024-10-10

## 2024-10-10 PROCEDURE — RXMED WILLOW AMBULATORY MEDICATION CHARGE

## 2024-10-14 ENCOUNTER — PHARMACY VISIT (OUTPATIENT)
Dept: PHARMACY | Facility: CLINIC | Age: 29
End: 2024-10-14
Payer: MEDICARE

## 2024-11-07 DIAGNOSIS — F90.9 ATTENTION DEFICIT HYPERACTIVITY DISORDER (ADHD), UNSPECIFIED ADHD TYPE: ICD-10-CM

## 2024-11-07 NOTE — TELEPHONE ENCOUNTER
Patient LVM requesting refill of Focalin XR 15 mg.  She requested we send to her new pharmacy, [facility] in [place], which I added to her profile.      LOV - 9/13/24  NOV - 3/13/25

## 2024-11-08 ENCOUNTER — SPECIALTY PHARMACY (OUTPATIENT)
Dept: PHARMACY | Facility: CLINIC | Age: 29
End: 2024-11-08

## 2024-11-08 PROCEDURE — RXMED WILLOW AMBULATORY MEDICATION CHARGE

## 2024-11-09 RX ORDER — DEXMETHYLPHENIDATE HYDROCHLORIDE 15 MG/1
15 CAPSULE, EXTENDED RELEASE ORAL DAILY
Qty: 30 CAPSULE | Refills: 0 | Status: SHIPPED | OUTPATIENT
Start: 2024-11-09 | End: 2024-12-09

## 2024-11-11 ENCOUNTER — PHARMACY VISIT (OUTPATIENT)
Dept: PHARMACY | Facility: CLINIC | Age: 29
End: 2024-11-11
Payer: MEDICARE

## 2024-11-21 ENCOUNTER — APPOINTMENT (OUTPATIENT)
Dept: DERMATOLOGY | Facility: CLINIC | Age: 29
End: 2024-11-21
Payer: COMMERCIAL

## 2024-11-21 DIAGNOSIS — R21 RASH AND OTHER NONSPECIFIC SKIN ERUPTION: ICD-10-CM

## 2024-11-21 PROCEDURE — 99213 OFFICE O/P EST LOW 20 MIN: CPT | Performed by: DERMATOLOGY

## 2024-11-21 RX ORDER — DOXYCYCLINE 100 MG/1
100 TABLET ORAL 2 TIMES DAILY
Qty: 120 TABLET | Refills: 0 | Status: SHIPPED | OUTPATIENT
Start: 2024-11-21 | End: 2025-01-20

## 2024-11-21 NOTE — PROGRESS NOTES
Subjective     Marina Quiroga is a 29 y.o. female who presents for the following: Rash (Pt was told to stop BPO, clindamycin and tretinoin . Pt stated every flare up the protopic but the rash has not subsided keeps coming back. ). Cleared up for about 6 weeks ago. Last week she developed itchiness but not bumps yet. She put the protopic which helped. No relationship to menstrual periods. Contact with fiancees facial hair causes bumps to come up.  On OCP and uses condoms.    Review of Systems:  No other skin or systemic complaints other than what is documented elsewhere in the note.    The following portions of the chart were reviewed this encounter and updated as appropriate:          Skin Cancer History  No skin cancer on file.      Specialty Problems          Dermatology Problems    Dermatitis    Rash        Objective   Well appearing patient in no apparent distress; mood and affect are within normal limits.    A focused skin examination was performed. All findings within normal limits unless otherwise noted below.    Assessment/Plan   1. Rash and other nonspecific skin eruption    Related Procedures  Follow Up In Dermatology - Established Patient

## 2024-12-05 ENCOUNTER — APPOINTMENT (OUTPATIENT)
Dept: DERMATOLOGY | Facility: CLINIC | Age: 29
End: 2024-12-05
Payer: COMMERCIAL

## 2024-12-10 ENCOUNTER — SPECIALTY PHARMACY (OUTPATIENT)
Dept: PHARMACY | Facility: CLINIC | Age: 29
End: 2024-12-10

## 2024-12-10 PROCEDURE — RXMED WILLOW AMBULATORY MEDICATION CHARGE

## 2024-12-11 ENCOUNTER — PHARMACY VISIT (OUTPATIENT)
Dept: PHARMACY | Facility: CLINIC | Age: 29
End: 2024-12-11
Payer: MEDICARE

## 2024-12-18 ENCOUNTER — OFFICE VISIT (OUTPATIENT)
Dept: CARDIOLOGY | Facility: CLINIC | Age: 29
End: 2024-12-18
Payer: COMMERCIAL

## 2024-12-18 VITALS
BODY MASS INDEX: 26.12 KG/M2 | SYSTOLIC BLOOD PRESSURE: 128 MMHG | WEIGHT: 153 LBS | HEIGHT: 64 IN | DIASTOLIC BLOOD PRESSURE: 87 MMHG | HEART RATE: 79 BPM | OXYGEN SATURATION: 90 %

## 2024-12-18 DIAGNOSIS — R03.0 BORDERLINE SYSTOLIC HYPERTENSION: ICD-10-CM

## 2024-12-18 DIAGNOSIS — R00.2 PALPITATIONS: Primary | ICD-10-CM

## 2024-12-18 PROCEDURE — 93010 ELECTROCARDIOGRAM REPORT: CPT | Performed by: INTERNAL MEDICINE

## 2024-12-18 PROCEDURE — 93005 ELECTROCARDIOGRAM TRACING: CPT | Performed by: INTERNAL MEDICINE

## 2024-12-18 PROCEDURE — 99213 OFFICE O/P EST LOW 20 MIN: CPT | Performed by: INTERNAL MEDICINE

## 2024-12-18 PROCEDURE — 1036F TOBACCO NON-USER: CPT | Performed by: INTERNAL MEDICINE

## 2024-12-18 PROCEDURE — 3008F BODY MASS INDEX DOCD: CPT | Performed by: INTERNAL MEDICINE

## 2024-12-18 RX ORDER — AMLODIPINE BESYLATE 2.5 MG/1
2.5 TABLET ORAL DAILY
Qty: 90 TABLET | Refills: 3 | Status: SHIPPED | OUTPATIENT
Start: 2024-12-18

## 2024-12-18 ASSESSMENT — ENCOUNTER SYMPTOMS
OCCASIONAL FEELINGS OF UNSTEADINESS: 0
LOSS OF SENSATION IN FEET: 0
DEPRESSION: 0

## 2024-12-18 ASSESSMENT — COLUMBIA-SUICIDE SEVERITY RATING SCALE - C-SSRS
1. IN THE PAST MONTH, HAVE YOU WISHED YOU WERE DEAD OR WISHED YOU COULD GO TO SLEEP AND NOT WAKE UP?: NO
6. HAVE YOU EVER DONE ANYTHING, STARTED TO DO ANYTHING, OR PREPARED TO DO ANYTHING TO END YOUR LIFE?: NO
2. HAVE YOU ACTUALLY HAD ANY THOUGHTS OF KILLING YOURSELF?: NO

## 2024-12-18 ASSESSMENT — PAIN SCALES - GENERAL: PAINLEVEL_OUTOF10: 0-NO PAIN

## 2024-12-18 NOTE — PATIENT INSTRUCTIONS
You were seen in the [facility] for your palpitations.     Your blood pressure has been at goal range 120s-130 mm Hg range taking amlodipine 2.5 mg. Blood pressure may affect your feeling of palpitations and you likely were having a steep BP increase with exercise before we starting amlodipine. I recommend you continue amlodipine 2.5 mg a day to help relax the muscles in the walls of your arteries to lower blood pressure and make it easier for the body to deliver blood flow to your muscles during exercise.     WIth your history of SVT including July 2021 ablation for AV abel re-entry tachycardia (AVNRT), I had you get a 2 week heart rate monitor in 5/26 - 6/9/2022 showed normal heart rhythms without sustained SVT and only rare PACs/PVCs.      Extra heart beats called premature atrial or premature ventricular contractions coming from the top or bottom of the heart. These extra beats cause the heart to pause for a slightly longer time than a normal heart beat and the next beat of the heart is stronger than normal (causing the feeling of a skip and then a thump). This is normal for how the heart works and these extra beats are not dangerous.    You have had a few palpitation episodes during exercise that terminated with vagal maneuvers. I recommend we continue conservative management at this time. If these episodes get more frequent or longer, I can refer you back to electrophysiology.     I recommend that you try to minimize emotional stress, get enough sleep at night, and minimize alcohol and caffeine intake to help prevent you from sensing these normal extra heart beats.      Your heart exam today is normal. Your ECG today is normal variant and the same as your last ECGs in December 2023 and September 2021. Your heart rate is 84 beats per minute with same incomplete right bundle branch block which is a normal variant.    Follow up with Dr. Lizama in 12 months

## 2024-12-18 NOTE — PROGRESS NOTES
"Subjective   Marina Quiroga is a 29 y.o. female who presents to the [facility] ffor follow up of palpitations. Last seen in December 2023.     Since our last visit, no active cardiac symptoms of chest pain, dyspnea on exertion, PND, orthopnea, TULIO, syncope, or claudication. She is an  and has good exercise capacity. Running 1/2 marathons and doing 50+ miles/week of exercise training.     Notes 2-3 episodes of sudden onset SVT during training sessions. Each has terminated with vagal maneuvers. No associated chest pain, lightheadedness, or dyspnea. Blood pressure at goal 120-130 mm Hg taking amlodipine.     She has a history of SVT diagnosed at age 25 yo. 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 Gomez. This ablation was complicated by post-procedural R femoral AV fistula formation that was treated conservatively. We did 2 week HR monitor 5/26 - 6/9/2022 for increase in palpitation symptomatology to see if there was SVT recurrence but only rare PAC/PVCs detected.     Past Medical History:  1 SVT: s/p 7/2021 AVNRT RFA  2. ADHD  3. Migraine headaches  4. Anxiety      Social History:  Nonsmoker     Family History:  Father has CAD and has had MI x2     Review of Systems    A 14 point review of systems was asked. All questions were negative except for pertinent positives listed in the HPI.      Objective   Physical Exam  BP Readings from Last 3 Encounters:   12/18/24 128/87   09/13/24 128/80   03/07/24 120/80      Wt Readings from Last 3 Encounters:   12/18/24 69.4 kg (153 lb)   09/13/24 71 kg (156 lb 9.6 oz)   03/07/24 67.1 kg (148 lb)      BMI: Estimated body mass index is 26.26 kg/m² as calculated from the following:    Height as of this encounter: 1.626 m (5' 4\").    Weight as of this encounter: 69.4 kg (153 lb).  BSA: Estimated body surface area is 1.77 meters squared as calculated from the following:    Height as of this encounter: 1.626 m (5' 4\").    Weight as of this " encounter: 69.4 kg (153 lb).    General: no acute distress  HEENT: EOMI, no scleral icterus.  Lungs: Clear to auscultation bilaterally without wheezing, rales, or rhonchi.  Cardiovascular: Regular rhythm and rate. Normal S1 and S2. No murmurs, rubs, or gallops are appreciated. JVP normal.  Abdomen: Soft, nontender, nondistended. Bowel sounds present.  Extremities: Warm and well perfused with equal 2+ pulses bilaterally.  No edema present.  Neurologic: Alert and oriented x3.    I have personally reviewed the following images and laboratory findings:  Last echocardiogram:   10/25/2019 ([facility]) echo: LV EF 60-65%, no LVH (LVMI 73 gm/m2), normal diastology (E/e' 8.6), normal LA size (ANA 27 ml/m2), normal RV/RA, no AI, trace MR, trace TR, RVSP 23 mm Hg (RAP 3 mm Hg)    Last cath / stress test:   10/25/2019 ([facility]) ETT treadmill: 17.1 METs, no ischemia, peak  bpm (100% APMHR), /76 -> 164/80 mmHg    Most recent EC2024 ECG: Sinus rhythm, 84 bpm, incomplete RBBB. Personally reviewed in office.    2023 ECG: Sinus rhythm, 77 bpm, incomplete RBBB. Unchanged from  ECG.    2022 ECG: Sinus rhythm, 77 bpm, marked sinus arrhythmia, incomplete RBBB. ECG unchanged from 2021.      Assessment/Plan   1. Palpitations / h/o SVT:  Had AVNRT RFA in 2021 by Dr. Gomez. Has recurrence of some palpitations training for / marathon in 2023.    Had echocardiogram done in [place], [place] 4 years ago with finding that her heart is structurally normal. We do not need to repeat this test now.     May 2022 2 week HR monitor showed no recurring SVT and rare PACs/PVCs. Trigger avoidance recommendations given to patient.     Now with rare episodes of self limited palpitations with aerobic exercise training that terminate with vagal maneuvers. Recommend observation at this time.     2. Borderline hypertension:  Blood pressure well controlled with amlodipine 2.5 mg a day. At goal  SBP range 120-130 mm Hg.  Increase in BP does not correlate with dose adjustments of stimulant medication for ADHD. Keep BP log book. Less post-exercise palpitations with use of amlodipine.     Follow up with Dr. Lizama in 12 months.           SIGNATURE: Alex Lizama MD PATIENT NAME: Marina Quiroga   DATE/TIME: December 18, 2024 9:38 AM MRN: [id number]

## 2024-12-20 LAB
ATRIAL RATE: 84 BPM
P AXIS: 30 DEGREES
P OFFSET: 209 MS
P ONSET: 161 MS
PR INTERVAL: 120 MS
Q ONSET: 221 MS
QRS COUNT: 13 BEATS
QRS DURATION: 96 MS
QT INTERVAL: 378 MS
QTC CALCULATION(BAZETT): 446 MS
QTC FREDERICIA: 422 MS
R AXIS: 53 DEGREES
T AXIS: 19 DEGREES
T OFFSET: 410 MS
VENTRICULAR RATE: 84 BPM

## 2024-12-23 DIAGNOSIS — R21 RASH AND OTHER NONSPECIFIC SKIN ERUPTION: ICD-10-CM

## 2024-12-23 RX ORDER — DOXYCYCLINE 100 MG/1
100 TABLET ORAL 2 TIMES DAILY
Qty: 120 TABLET | Refills: 0 | Status: SHIPPED | OUTPATIENT
Start: 2024-12-23 | End: 2025-02-21

## 2024-12-23 NOTE — TELEPHONE ENCOUNTER
Rx Refill Request Telephone Encounter  doxycycline (Adoxa) 100 mg tablet     Pharmacy:   [facility]    NOV:  3/13/25

## 2024-12-27 DIAGNOSIS — F90.9 ATTENTION DEFICIT HYPERACTIVITY DISORDER (ADHD), UNSPECIFIED ADHD TYPE: ICD-10-CM

## 2024-12-28 RX ORDER — DEXMETHYLPHENIDATE HYDROCHLORIDE 15 MG/1
15 CAPSULE, EXTENDED RELEASE ORAL DAILY
Qty: 30 CAPSULE | Refills: 0 | Status: SHIPPED | OUTPATIENT
Start: 2024-12-28 | End: 2025-01-27

## 2025-02-06 ENCOUNTER — SPECIALTY PHARMACY (OUTPATIENT)
Dept: PHARMACY | Facility: CLINIC | Age: 30
End: 2025-02-06

## 2025-02-06 DIAGNOSIS — G43.109 MIGRAINE WITH AURA, NOT INTRACTABLE, WITHOUT STATUS MIGRAINOSUS: ICD-10-CM

## 2025-02-06 DIAGNOSIS — G43.009 MIGRAINE WITHOUT AURA, NOT INTRACTABLE, WITHOUT STATUS MIGRAINOSUS: ICD-10-CM

## 2025-02-07 PROCEDURE — RXMED WILLOW AMBULATORY MEDICATION CHARGE

## 2025-02-17 DIAGNOSIS — F90.9 ATTENTION DEFICIT HYPERACTIVITY DISORDER (ADHD), UNSPECIFIED ADHD TYPE: ICD-10-CM

## 2025-02-17 RX ORDER — DEXMETHYLPHENIDATE HYDROCHLORIDE 15 MG/1
15 CAPSULE, EXTENDED RELEASE ORAL DAILY
Qty: 30 CAPSULE | Refills: 0 | Status: SHIPPED | OUTPATIENT
Start: 2025-02-17 | End: 2025-03-19

## 2025-02-17 NOTE — TELEPHONE ENCOUNTER
15 mg, Daily   Pt LVM asking for refill of Dexmethylphenidate XR 15mg be sent to [facility] [id number] [place], [place]       Patient can be reached at [phone]

## 2025-02-18 ENCOUNTER — PHARMACY VISIT (OUTPATIENT)
Dept: PHARMACY | Facility: CLINIC | Age: 30
End: 2025-02-18
Payer: MEDICARE

## 2025-03-13 ENCOUNTER — APPOINTMENT (OUTPATIENT)
Dept: NEUROLOGY | Facility: CLINIC | Age: 30
End: 2025-03-13
Payer: COMMERCIAL

## 2025-03-13 ENCOUNTER — APPOINTMENT (OUTPATIENT)
Dept: PRIMARY CARE | Facility: CLINIC | Age: 30
End: 2025-03-13
Payer: COMMERCIAL

## 2025-03-13 VITALS
WEIGHT: 153.6 LBS | SYSTOLIC BLOOD PRESSURE: 118 MMHG | DIASTOLIC BLOOD PRESSURE: 78 MMHG | TEMPERATURE: 98.2 F | BODY MASS INDEX: 26.37 KG/M2

## 2025-03-13 DIAGNOSIS — G95.0 SYRINX OF SPINAL CORD (MULTI): ICD-10-CM

## 2025-03-13 DIAGNOSIS — I10 PRIMARY HYPERTENSION: ICD-10-CM

## 2025-03-13 DIAGNOSIS — F90.9 ATTENTION DEFICIT HYPERACTIVITY DISORDER (ADHD), UNSPECIFIED ADHD TYPE: Primary | ICD-10-CM

## 2025-03-13 DIAGNOSIS — I47.10 PAROXYSMAL SVT (SUPRAVENTRICULAR TACHYCARDIA) (CMS-HCC): ICD-10-CM

## 2025-03-13 DIAGNOSIS — Z00.00 WELL ADULT EXAM: ICD-10-CM

## 2025-03-13 PROCEDURE — 3078F DIAST BP <80 MM HG: CPT | Performed by: INTERNAL MEDICINE

## 2025-03-13 PROCEDURE — 1036F TOBACCO NON-USER: CPT | Performed by: INTERNAL MEDICINE

## 2025-03-13 PROCEDURE — 99213 OFFICE O/P EST LOW 20 MIN: CPT | Performed by: INTERNAL MEDICINE

## 2025-03-13 PROCEDURE — 3074F SYST BP LT 130 MM HG: CPT | Performed by: INTERNAL MEDICINE

## 2025-03-13 NOTE — PROGRESS NOTES
Subjective   Patient ID: Marina Quiroga is a 29 y.o. female who presents for Follow-up (6 months / update UDS, contract).    HPI   Good focus and concentration w/ rx. Substantially improved work function with stimulant. No change in appetite. No insomnia. In general feels quite well.   Is following w/ cards and neuro--> all stable  Getting  next year.  Moving to [place].    Review of Systems   All other systems reviewed and are negative.      Objective   /78   Temp 36.8 °C (98.2 °F)   Wt 69.7 kg (153 lb 9.6 oz)   BMI 26.37 kg/m²     Physical Exam  Constitutional:       Appearance: Normal appearance.   Neurological:      Mental Status: She is alert.   Psychiatric:         Mood and Affect: Mood normal.         Thought Content: Thought content normal.         Judgment: Judgment normal.     Assessment/Plan   Problem List Items Addressed This Visit       ADD (attention deficit disorder)       #1 ADD. con't rx. Spent time reviewing risks of this medicine. Discussed abuse and addiction potential. Discussed potential cardiovascular adverse events. Discussed anxiety. I have personally reviewed the OARRS report for the patient.  I have considered the risks of abuse, dependence, addiction and diversion. I believe that it is clinically appropriate for the patient to be prescribed this medication. . Urine next visit. Consider reducing dose next visit.  Per cardiologist, safety continue stimulant  #2 migrains- f/u  neuro PRN.  Con't nurtec PRN   #2 headaches-much better  #4 CTS- good   #5 SVT/AVNRT- clinically stable. Follow with cardiology/EP  #6 Anxiety- better/good.   #7 AV fistula- f/u vasc  #8 increased BPs- good. con't low salt. exercsie. f/u  cards  #9 vertigo- resolved.

## 2025-03-14 ENCOUNTER — OFFICE VISIT (OUTPATIENT)
Dept: NEUROLOGY | Facility: CLINIC | Age: 30
End: 2025-03-14
Payer: COMMERCIAL

## 2025-03-14 ENCOUNTER — SPECIALTY PHARMACY (OUTPATIENT)
Dept: PHARMACY | Facility: CLINIC | Age: 30
End: 2025-03-14

## 2025-03-14 DIAGNOSIS — G43.009 MIGRAINE WITHOUT AURA, NOT INTRACTABLE, WITHOUT STATUS MIGRAINOSUS: ICD-10-CM

## 2025-03-14 DIAGNOSIS — G43.109 MIGRAINE WITH AURA, NOT INTRACTABLE, WITHOUT STATUS MIGRAINOSUS: ICD-10-CM

## 2025-03-14 PROCEDURE — RXMED WILLOW AMBULATORY MEDICATION CHARGE

## 2025-03-14 PROCEDURE — 1036F TOBACCO NON-USER: CPT | Performed by: STUDENT IN AN ORGANIZED HEALTH CARE EDUCATION/TRAINING PROGRAM

## 2025-03-14 PROCEDURE — 99214 OFFICE O/P EST MOD 30 MIN: CPT | Performed by: STUDENT IN AN ORGANIZED HEALTH CARE EDUCATION/TRAINING PROGRAM

## 2025-03-14 ASSESSMENT — ENCOUNTER SYMPTOMS
DEPRESSION: 0
OCCASIONAL FEELINGS OF UNSTEADINESS: 0
LOSS OF SENSATION IN FEET: 0

## 2025-03-14 ASSESSMENT — PATIENT HEALTH QUESTIONNAIRE - PHQ9
1. LITTLE INTEREST OR PLEASURE IN DOING THINGS: NOT AT ALL
2. FEELING DOWN, DEPRESSED OR HOPELESS: NOT AT ALL
SUM OF ALL RESPONSES TO PHQ9 QUESTIONS 1 AND 2: 0

## 2025-03-14 NOTE — PROGRESS NOTES
Subjective   Marina Quiroga is a 29 y.o.   female who is being followed for episodic migraine wwo aura.     Since last seen, patient states that for she is having about 5/30  HA days per month. Notes that headaches have increased in the setting of weather changes.      Current Outpatient Medications   Medication Instructions    amLODIPine (NORVASC) 2.5 mg, oral, Daily, Take 1 tablet daily.    cyanocobalamin, vitamin B-12, (Vitamin B-12) 1,000 mcg tablet extended release Take 1 tablet daily    dexmethylphenidate XR (FOCALIN XR) 15 mg, oral, Daily, Do not crush, chew, or split.    NON FORMULARY Vitamin D 50 MCG (2000UNIT) Oral Capsule; Take 1 capsule daily    norethindrone-e.estradioL-iron (Microgestin FE 1.5/30) 1.5 mg-30 mcg (21)/75 mg (7) tablet TAKE 1 TABLET DAILY.    rimegepant (NURTEC) 75 mg tablet,disintegrating DISSOLVE ONE (1) ORAL DISINTEGRATING TABLET IN MOUTH AT THE FIRST ONSET OF MIGRAINE. DO NOT EXCEED MORE THAN ONE TABLET IN 24 HOUR PERIOD.    tacrolimus (Protopic) 0.1 % ointment Topical, 2 times daily       Assessment/Plan   Patient with episodic migraine wwo aura. Headaches recently increased due to weather changes. Per our discussion will hold off on starting migraine preventive medication. Nurtec effective for breakthrough headaches.     - continue nurtec 75mg PRN  - follow up in 5-6 months

## 2025-03-16 LAB
AMPHET UR-MCNC: NEGATIVE NG/ML
AMPHETAMINES UR QL: NEGATIVE NG/ML
BARBITURATES UR QL: NEGATIVE NG/ML
BENZODIAZ UR QL: NEGATIVE NG/ML
BZE UR QL: NEGATIVE NG/ML
CREAT UR-MCNC: 176.8 MG/DL
MDA UR-MCNC: NEGATIVE NG/ML
MDEA UR-MCNC: NEGATIVE NG/ML
MDMA UR-MCNC: NEGATIVE NG/ML
METHADONE UR QL: NEGATIVE NG/ML
METHAMPHET UR-MCNC: NEGATIVE NG/ML
OPIATES UR QL: NEGATIVE NG/ML
OXIDANTS UR QL: NEGATIVE MCG/ML
OXYCODONE UR QL: NEGATIVE NG/ML
PCP UR QL: NEGATIVE NG/ML
PH UR: 5.8 [PH] (ref 4.5–9)
PHENTERMINE UR-MCNC: NEGATIVE NG/ML
QUEST NOTES AND COMMENTS: NORMAL
THC UR QL: NEGATIVE NG/ML

## 2025-03-17 ENCOUNTER — PHARMACY VISIT (OUTPATIENT)
Dept: PHARMACY | Facility: CLINIC | Age: 30
End: 2025-03-17
Payer: MEDICARE

## 2025-03-28 DIAGNOSIS — F90.9 ATTENTION DEFICIT HYPERACTIVITY DISORDER (ADHD), UNSPECIFIED ADHD TYPE: ICD-10-CM

## 2025-03-28 RX ORDER — DEXMETHYLPHENIDATE HYDROCHLORIDE 15 MG/1
15 CAPSULE, EXTENDED RELEASE ORAL DAILY
Qty: 30 CAPSULE | Refills: 0 | Status: SHIPPED | OUTPATIENT
Start: 2025-03-28 | End: 2025-04-27

## 2025-03-28 NOTE — TELEPHONE ENCOUNTER
Rx Refill Request Telephone Encounter  dexmethylphenidate XR (Focalin XR) 15 mg 24 hr capsule     Pharmacy:   [facility]    NOV:  9/15/25  
Rx sent for approval  
Admitted

## 2025-04-03 ENCOUNTER — APPOINTMENT (OUTPATIENT)
Dept: DERMATOLOGY | Facility: CLINIC | Age: 30
End: 2025-04-03
Payer: COMMERCIAL

## 2025-04-10 ENCOUNTER — SPECIALTY PHARMACY (OUTPATIENT)
Dept: PHARMACY | Facility: CLINIC | Age: 30
End: 2025-04-10

## 2025-04-11 ENCOUNTER — PHARMACY VISIT (OUTPATIENT)
Dept: PHARMACY | Facility: CLINIC | Age: 30
End: 2025-04-11
Payer: MEDICARE

## 2025-04-11 PROCEDURE — RXMED WILLOW AMBULATORY MEDICATION CHARGE

## 2025-05-09 ENCOUNTER — SPECIALTY PHARMACY (OUTPATIENT)
Dept: PHARMACY | Facility: CLINIC | Age: 30
End: 2025-05-09

## 2025-05-09 PROCEDURE — RXMED WILLOW AMBULATORY MEDICATION CHARGE

## 2025-05-13 ENCOUNTER — PHARMACY VISIT (OUTPATIENT)
Dept: PHARMACY | Facility: CLINIC | Age: 30
End: 2025-05-13
Payer: MEDICARE

## 2025-05-13 DIAGNOSIS — F90.9 ATTENTION DEFICIT HYPERACTIVITY DISORDER (ADHD), UNSPECIFIED ADHD TYPE: ICD-10-CM

## 2025-05-13 RX ORDER — DEXMETHYLPHENIDATE HYDROCHLORIDE 15 MG/1
15 CAPSULE, EXTENDED RELEASE ORAL DAILY
Qty: 30 CAPSULE | Refills: 0 | Status: SHIPPED | OUTPATIENT
Start: 2025-05-13 | End: 2025-06-12

## 2025-05-13 NOTE — TELEPHONE ENCOUNTER
Rx Refill Request Telephone Encounter  dexmethylphenidate XR (Focalin XR) 15 mg 24 hr capsule     Pharmacy:   [facility]    NOV:  9/15/25

## 2025-09-15 ENCOUNTER — APPOINTMENT (OUTPATIENT)
Dept: PRIMARY CARE | Facility: CLINIC | Age: 30
End: 2025-09-15
Payer: COMMERCIAL

## 2025-10-02 ENCOUNTER — APPOINTMENT (OUTPATIENT)
Dept: DERMATOLOGY | Facility: CLINIC | Age: 30
End: 2025-10-02
Payer: COMMERCIAL